# Patient Record
Sex: MALE | Race: WHITE | Employment: OTHER | URBAN - METROPOLITAN AREA
[De-identification: names, ages, dates, MRNs, and addresses within clinical notes are randomized per-mention and may not be internally consistent; named-entity substitution may affect disease eponyms.]

---

## 2024-04-05 ENCOUNTER — HOSPITAL ENCOUNTER (OUTPATIENT)
Facility: HOSPITAL | Age: 49
Setting detail: OUTPATIENT SURGERY
Discharge: HOME | End: 2024-04-05
Attending: HOSPITALIST | Admitting: HOSPITALIST
Payer: OTHER GOVERNMENT

## 2024-04-05 ENCOUNTER — ANESTHESIA (OUTPATIENT)
Facility: HOSPITAL | Age: 49
End: 2024-04-05
Payer: OTHER GOVERNMENT

## 2024-04-05 ENCOUNTER — ANESTHESIA EVENT (OUTPATIENT)
Facility: HOSPITAL | Age: 49
End: 2024-04-05
Payer: OTHER GOVERNMENT

## 2024-04-05 VITALS
BODY MASS INDEX: 31.95 KG/M2 | WEIGHT: 235.89 LBS | DIASTOLIC BLOOD PRESSURE: 67 MMHG | HEART RATE: 83 BPM | TEMPERATURE: 98.4 F | SYSTOLIC BLOOD PRESSURE: 97 MMHG | RESPIRATION RATE: 14 BRPM | OXYGEN SATURATION: 93 % | HEIGHT: 72 IN

## 2024-04-05 PROCEDURE — 7100000010 HC PHASE II RECOVERY - FIRST 15 MIN: Performed by: HOSPITALIST

## 2024-04-05 PROCEDURE — 3700000000 HC ANESTHESIA ATTENDED CARE: Performed by: HOSPITALIST

## 2024-04-05 PROCEDURE — 3600007512: Performed by: HOSPITALIST

## 2024-04-05 PROCEDURE — 2500000003 HC RX 250 WO HCPCS: Performed by: NURSE ANESTHETIST, CERTIFIED REGISTERED

## 2024-04-05 PROCEDURE — 3700000001 HC ADD 15 MINUTES (ANESTHESIA): Performed by: HOSPITALIST

## 2024-04-05 PROCEDURE — 3600007502: Performed by: HOSPITALIST

## 2024-04-05 PROCEDURE — 2580000003 HC RX 258: Performed by: HOSPITALIST

## 2024-04-05 PROCEDURE — 7100000011 HC PHASE II RECOVERY - ADDTL 15 MIN: Performed by: HOSPITALIST

## 2024-04-05 PROCEDURE — 6360000002 HC RX W HCPCS: Performed by: NURSE ANESTHETIST, CERTIFIED REGISTERED

## 2024-04-05 RX ORDER — MELOXICAM 15 MG/1
15 TABLET ORAL DAILY
COMMUNITY
Start: 2024-03-19

## 2024-04-05 RX ORDER — DEXMEDETOMIDINE HYDROCHLORIDE 100 UG/ML
INJECTION, SOLUTION INTRAVENOUS PRN
Status: DISCONTINUED | OUTPATIENT
Start: 2024-04-05 | End: 2024-04-05 | Stop reason: SDUPTHER

## 2024-04-05 RX ORDER — PROPOFOL 10 MG/ML
INJECTION, EMULSION INTRAVENOUS PRN
Status: DISCONTINUED | OUTPATIENT
Start: 2024-04-05 | End: 2024-04-05 | Stop reason: SDUPTHER

## 2024-04-05 RX ORDER — ALBUTEROL SULFATE 90 UG/1
2 AEROSOL, METERED RESPIRATORY (INHALATION) EVERY 4 HOURS PRN
COMMUNITY
Start: 2024-03-26

## 2024-04-05 RX ORDER — SODIUM CHLORIDE 0.9 % (FLUSH) 0.9 %
5-40 SYRINGE (ML) INJECTION PRN
Status: DISCONTINUED | OUTPATIENT
Start: 2024-04-05 | End: 2024-04-05 | Stop reason: HOSPADM

## 2024-04-05 RX ORDER — SODIUM CHLORIDE 0.9 % (FLUSH) 0.9 %
5-40 SYRINGE (ML) INJECTION EVERY 12 HOURS SCHEDULED
Status: DISCONTINUED | OUTPATIENT
Start: 2024-04-05 | End: 2024-04-05 | Stop reason: HOSPADM

## 2024-04-05 RX ORDER — TRAZODONE HYDROCHLORIDE 50 MG/1
50 TABLET ORAL DAILY PRN
COMMUNITY
Start: 2024-01-31

## 2024-04-05 RX ORDER — SODIUM CHLORIDE 9 MG/ML
25 INJECTION, SOLUTION INTRAVENOUS PRN
Status: DISCONTINUED | OUTPATIENT
Start: 2024-04-05 | End: 2024-04-05 | Stop reason: HOSPADM

## 2024-04-05 RX ADMIN — SODIUM CHLORIDE: 9 INJECTION, SOLUTION INTRAVENOUS at 13:37

## 2024-04-05 RX ADMIN — PROPOFOL 200 MCG/KG/MIN: 10 INJECTION, EMULSION INTRAVENOUS at 13:46

## 2024-04-05 RX ADMIN — PROPOFOL 50 MG: 10 INJECTION, EMULSION INTRAVENOUS at 13:45

## 2024-04-05 RX ADMIN — DEXMEDETOMIDINE 10 MCG: 100 INJECTION, SOLUTION INTRAVENOUS at 13:46

## 2024-04-05 ASSESSMENT — PAIN - FUNCTIONAL ASSESSMENT: PAIN_FUNCTIONAL_ASSESSMENT: 0-10

## 2024-04-05 NOTE — DISCHARGE INSTRUCTIONS
[facility]  [facility] or [facility]  Juan Jo MD  [phone]      April 5, 2024    Gil Koehler  YOB: 1975    COLONOSCOPY DISCHARGE INSTRUCTIONS    If there is redness at IV site you should apply warm compress to area.  If redness or soreness persist contact Dr. Jo's office or your primary care doctor.    There may be a slight amount of blood passed from the rectum.  Gaseous discomfort may develop, but walking, belching will help relieve this.  You may not operate a vehicle for 12 hours  You may not operate machinery or dangerous appliances for rest of today  You may not drink alcoholic beverages for 12 hours  Avoid making any critical decisions for 24 hours    DIET:  You may resume your normal diet, but some patients find that heavy or large meals may lead to indigestion or vomiting.  I suggest a light meal as first food intake.    MEDICATIONS:  The use of some over-the-counter pain medication may lead to bleeding after colon biopsies or polyp removal.  Tylenol (also called acetaminophen) is safe to take even if you have had colonoscopy with polyp removal.  Based on the procedure you had today you may safely take aspirin or aspirin-like products for the next seven (7) days.  Remember that Tylenol (also called acetaminophen) is safe to take after colonoscopy even if you have had biopsies or polyps removed.    ACTIVITY:  You may resume your normal household activities, but it is recommended that you spend the remainder of the day resting -  avoid any strenuous activity.    CALL DR. JO'S OFFICE IF:  Increasing pain, nausea, vomiting  Abdominal distension (swelling)  Significant new or increased bleeding (oral or rectal)  Fever/Chills  Chest pain/shortness of breath                       Additional instructions:   Impression:  -Normal colonoscopy.     Recommendations:   -Follow-up with PCP as previously

## 2024-04-05 NOTE — OP NOTE
[facility]  [facility]  Juan Jo MD  [phone]      2024    Colonoscopy Procedure Note  Gil Koehler  :  1975  [place] Medical Record Number: [id number]    Indications:   Screening colonoscopy  PCP:  Duy Mckeon PA  Anesthesia/Sedation: See Anesthesia Record  Endoscopist:  Dr. Juan Jo  Complications:  None  Estimated Blood Loss:  None    Surgical assistant: Circulator: Isabel Spencer RN  Endoscopy Technician: Brooks Kim none unless otherwise specified.     Permit:  The indications, risks, benefits and alternatives were reviewed with the patient or their decision maker who was provided an opportunity to ask questions and all questions were answered.  The specific risks of colonoscopy with conscious sedation were reviewed, including but not limited to anesthetic complication, bleeding, adverse drug reaction, missed lesion, infection, IV site reactions, and intestinal perforation which would lead to the need for surgical repair.  Alternatives to colonoscopy including radiographic imaging, observation without testing, or laboratory testing were reviewed including the limitations of those alternatives.  After considering the options and having all their questions answered, the patient or their decision maker provided both verbal and written consent to proceed.        Procedure in Detail:  After obtaining informed consent, positioning of the patient in the left lateral decubitus position, and conduction of a pre-procedure pause or \"time out\" the endoscope was introduced into the anus and advanced to the cecum, which was identified by the ileocecal valve and appendiceal orifice.  The quality of the colonic preparation was good.  A careful inspection was made as the colonoscope was withdrawn, findings and interventions are described below.    Findings:   -Espinoza

## 2024-04-05 NOTE — ANESTHESIA POSTPROCEDURE EVALUATION
Department of Anesthesiology  Postprocedure Note    Patient: Gil Koehler  MRN: [id number]  YOB: 1975  Date of evaluation: 4/5/2024    Procedure Summary       Date: 04/05/24 Room / Location: [place] [address] / [facility] ENDOSCOPY    Anesthesia Start: 1342 Anesthesia Stop: 1405    Procedure: COLONOSCOPY (Lower GI Region) Diagnosis:       Screen for colon cancer      (Screen for colon cancer [Z12.11])    Surgeons: Juan Jo MD Responsible Provider: John Miller MD    Anesthesia Type: MAC ASA Status: 3            Anesthesia Type: MAC    Joelle Phase I: Joelle Score: 10    Joelle Phase II: Joelle Score: 9    Anesthesia Post Evaluation    Patient location during evaluation: PACU  Patient participation: complete - patient participated  Level of consciousness: awake  Pain score: 0  Airway patency: patent  Nausea & Vomiting: no nausea and no vomiting  Cardiovascular status: blood pressure returned to baseline  Respiratory status: acceptable  Hydration status: euvolemic  Pain management: adequate    No notable events documented.

## 2024-04-05 NOTE — ANESTHESIA PRE PROCEDURE
Department of Anesthesiology  Preprocedure Note       Name:  Gil Koehler   Age:  48 y.o.  :  1975                                          MRN:  [id number]         Date:  2024      Surgeon: Surgeon(s):  Juan Jo MD    Procedure: Procedure(s):  COLONOSCOPY    Medications prior to admission:   Prior to Admission medications    Medication Sig Start Date End Date Taking? Authorizing Provider   albuterol sulfate HFA (PROVENTIL;VENTOLIN;PROAIR) 108 (90 Base) MCG/ACT inhaler Inhale 2 puffs into the lungs every 4 hours as needed 3/26/24  Yes ProviderPadmini MD   meloxicam (MOBIC) 15 MG tablet Take 1 tablet by mouth daily 3/19/24  Yes ProviderPadmini MD   traZODone (DESYREL) 50 MG tablet Take 1 tablet by mouth daily as needed for Sleep 24  Yes ProviderPadmini MD       Current medications:    Current Facility-Administered Medications   Medication Dose Route Frequency Provider Last Rate Last Admin    sodium chloride flush 0.9 % injection 5-40 mL  5-40 mL IntraVENous 2 times per day Juan Jo MD        sodium chloride flush 0.9 % injection 5-40 mL  5-40 mL IntraVENous PRN Juan Jo MD        0.9 % sodium chloride infusion  25 mL IntraVENous PRN Juan Jo MD           Allergies:    Allergies   Allergen Reactions    Penicillins      Reaction as a kid       Problem List:  There is no problem list on file for this patient.      Past Medical History:        Diagnosis Date    Asthma     Sleep apnea with use of continuous positive airway pressure (CPAP)        Past Surgical History:        Procedure Laterality Date    HERNIA REPAIR         Social History:    Social History     Tobacco Use    Smoking status: Never    Smokeless tobacco: Current     Types: Snuff   Substance Use Topics    Alcohol use: Yes     Alcohol/week: 4.0 - 5.0 standard drinks of alcohol     Types: 4 - 5 Glasses of wine per week                                Ready to quit: Not

## 2024-04-05 NOTE — H&P
Pre-Endoscopy H&P Update  Chief complaint/HPI/ROS:  The indication for the procedure, the patient's history and the patient's current medications are reviewed prior to the procedure and that data is reported on the H&P to which this document is attached.  Any significant complaints with regard to organ systems will be noted, and if not mentioned then a review of systems is not contributory.  Past Medical History:   Diagnosis Date    Asthma     Sleep apnea with use of continuous positive airway pressure (CPAP)       Past Surgical History:   Procedure Laterality Date    HERNIA REPAIR       Social   Social History     Tobacco Use    Smoking status: Never    Smokeless tobacco: Current     Types: Snuff   Substance Use Topics    Alcohol use: Yes     Alcohol/week: 4.0 - 5.0 standard drinks of alcohol     Types: 4 - 5 Glasses of wine per week      Family History   Problem Relation Age of Onset    Cancer Father         leukemia    Cancer Maternal Aunt       Allergies   Allergen Reactions    Penicillins      Reaction as a kid      Prior to Admission Medications   Prescriptions Last Dose Informant Patient Reported? Taking?   albuterol sulfate HFA (PROVENTIL;VENTOLIN;PROAIR) 108 (90 Base) MCG/ACT inhaler 4/4/2024  Yes Yes   Sig: Inhale 2 puffs into the lungs every 4 hours as needed   meloxicam (MOBIC) 15 MG tablet 4/3/2024  Yes Yes   Sig: Take 1 tablet by mouth daily   traZODone (DESYREL) 50 MG tablet 4/3/2024  Yes Yes   Sig: Take 1 tablet by mouth daily as needed for Sleep      Facility-Administered Medications: None       PHYSICAL EXAM:  The patient is examined immediately prior to the procedure.  Vitals:    04/05/24 1224   BP: 114/69   Pulse: 78   Resp: 14   Temp: 98.6 °F (37 °C)   SpO2: 94%     Gen: Appears comfortable, no distress.  Pulm: comfortable respirations with no abnormal audible breath sounds  HEART: well perfused, no abnormal audible heart sounds  GI: abdomen flat.    PLAN:  Informed consent discussion held,

## 2024-04-05 NOTE — PROGRESS NOTES
